# Patient Record
Sex: FEMALE | Race: WHITE
[De-identification: names, ages, dates, MRNs, and addresses within clinical notes are randomized per-mention and may not be internally consistent; named-entity substitution may affect disease eponyms.]

---

## 2018-09-10 ENCOUNTER — HOSPITAL ENCOUNTER (INPATIENT)
Dept: HOSPITAL 25 - MCHOB | Age: 33
LOS: 2 days | Discharge: HOME | DRG: 540 | End: 2018-09-12
Attending: OBSTETRICS & GYNECOLOGY | Admitting: OBSTETRICS & GYNECOLOGY
Payer: COMMERCIAL

## 2018-09-10 DIAGNOSIS — O34.211: Primary | ICD-10-CM

## 2018-09-10 DIAGNOSIS — Z3A.41: ICD-10-CM

## 2018-09-10 DIAGNOSIS — O48.0: ICD-10-CM

## 2018-09-10 PROCEDURE — 36415 COLL VENOUS BLD VENIPUNCTURE: CPT

## 2018-09-10 PROCEDURE — 85025 COMPLETE CBC W/AUTO DIFF WBC: CPT

## 2018-09-10 RX ADMIN — DOCUSATE SODIUM SCH MG: 100 CAPSULE, LIQUID FILLED ORAL at 14:21

## 2018-09-10 RX ADMIN — IBUPROFEN PRN MG: 600 TABLET, FILM COATED ORAL at 12:10

## 2018-09-10 RX ADMIN — SIMETHICONE CHEW TAB 80 MG SCH MG: 80 TABLET ORAL at 12:10

## 2018-09-10 RX ADMIN — SIMETHICONE CHEW TAB 80 MG SCH MG: 80 TABLET ORAL at 20:35

## 2018-09-10 RX ADMIN — DOCUSATE SODIUM SCH MG: 100 CAPSULE, LIQUID FILLED ORAL at 20:35

## 2018-09-10 RX ADMIN — DIPHENHYDRAMINE HYDROCHLORIDE PRN MG: 25 CAPSULE ORAL at 19:30

## 2018-09-10 RX ADMIN — SIMETHICONE CHEW TAB 80 MG SCH MG: 80 TABLET ORAL at 18:10

## 2018-09-10 RX ADMIN — IBUPROFEN PRN MG: 600 TABLET, FILM COATED ORAL at 18:11

## 2018-09-10 NOTE — OP
OPERATIVE REPORT:

 

DATE OF OPERATION:  09/10/18

 

DATE OF BIRTH:  [birth year]

 

SURGEON:  Ese Seo MD

 

ASSISTANT:  Elliot Cannon CNM

 

PRE-OP DIAGNOSES:  Intrauterine gestation of 41 weeks, prior  section.

 

POST-OP DIAGNOSES:  Intrauterine gestation of 41 weeks, prior  section.

 

OPERATIVE PROCEDURE:  Repeat lower transverse  section.

 

ESTIMATED BLOOD LOSS:  700 mL.

 

FLUIDS:  Crystalloid.

 

FINDINGS:  Normal-appearing uterus, ovaries and tubes, normal-appearing placenta
, female infant, weight 9 pounds 3 ounces, Apgars of 8 and 9.

 

DRAINS:  Palacios catheter with clear urine.

 

COUNTS:  All correct.

 

DESCRIPTION OF PROCEDURE:  After informed consent was signed, the patient was 
taken to the operating room where she was given spinal anesthesia; it was found 
to be adequate.  She was prepped and draped in the dorsal spine position with 
leftward lift.  A Pfannenstiel skin incision was made with a scalpel and 
carried down to the underlying layer of fascia with the scalpel.  The fascia 
was incised on either side of the midline and the fascial incision extended 
laterally with the Rodriguez scissors. The inferior edge of the fascial incision was 
grasped with Kocher clamps, tented up, and dissected down with sharp dissection 
with the Rodriguez scissors.  Then, the superior edge of the fascial incision was 
grasped with Kocher clamps, tented up, and dissected down with the Rodriguez 
succors.  The rectus muscles were  in the midline and the peritoneum 
was entered bluntly.  The peritoneal incision was extended with the Rodriguez 
scissors.  The bladder blade was inserted and a transverse incision was made in 
the lower uterine segment with the scalpel.  The uterine incision was extended 
superiorly and inferiorly with blunt pressure.  The infant's head then 
delivered with fundal pressure followed by the shoulders and the rest of the 
body.  The cord was milked towards the baby and clamped x2 and cut.  The baby 
was handed to the neonatologist.  Cord blood was collected.  Placenta delivered 
with fundal massage and gentle cord traction.  The uterus was cleared of clots 
and debris.  The uterine incision was then closed with 0 Vicryl in a running 
locked fashion with a second layer of suture imbricating the first.  The 
abdomen was irrigated.  The incision was inspected again and good hemostasis 
was noted.  The peritoneum was closed with 3-0 vicryl in a running unlocked 
fashion.  The fascia was closed with 0 Vicryl in a running unlocked fashion. 
The subcuticular layer was irrigated and the skin was closed with 4-0 Monocryl 
in a running subcuticular fashion.  Mastisol and Steri-Strips were placed on 
the incision.  A dressing was placed.  The patient was cleaned, moved to the 
stretcher, and taken to the recovery room in stable condition.

 

 [id number]/CPS #: [id number]

ASCENCION

## 2018-09-11 LAB
BASOPHILS # BLD AUTO: 0.1 10^3/UL (ref 0–0.2)
EOSINOPHIL # BLD AUTO: 0 10^3/UL (ref 0–0.6)
HCT VFR BLD AUTO: 28 % (ref 35–47)
HGB BLD-MCNC: 9.6 G/DL (ref 12–16)
LYMPHOCYTES # BLD AUTO: 1.6 10^3/UL (ref 1–4.8)
MCH RBC QN AUTO: 29 PG (ref 27–31)
MCHC RBC AUTO-ENTMCNC: 35 G/DL (ref 31–36)
MCV RBC AUTO: 85 FL (ref 80–97)
MONOCYTES # BLD AUTO: 0.7 10^3/UL (ref 0–0.8)
NEUTROPHILS # BLD AUTO: 6.8 10^3/UL (ref 1.5–7.7)
NRBC # BLD AUTO: 0 10^3/UL
NRBC BLD QL AUTO: 0
PLATELET # BLD AUTO: 130 10^3/UL (ref 150–450)
RBC # BLD AUTO: 3.28 10^6/UL (ref 4–5.4)
WBC # BLD AUTO: 9.2 10^3/UL (ref 3.5–10.8)

## 2018-09-11 RX ADMIN — Medication SCH MG: at 08:58

## 2018-09-11 RX ADMIN — DOCUSATE SODIUM SCH MG: 100 CAPSULE, LIQUID FILLED ORAL at 20:59

## 2018-09-11 RX ADMIN — SIMETHICONE CHEW TAB 80 MG SCH MG: 80 TABLET ORAL at 17:51

## 2018-09-11 RX ADMIN — IBUPROFEN PRN MG: 600 TABLET, FILM COATED ORAL at 17:51

## 2018-09-11 RX ADMIN — SIMETHICONE CHEW TAB 80 MG SCH MG: 80 TABLET ORAL at 08:58

## 2018-09-11 RX ADMIN — Medication SCH MG: at 20:55

## 2018-09-11 RX ADMIN — SIMETHICONE CHEW TAB 80 MG SCH MG: 80 TABLET ORAL at 13:52

## 2018-09-11 RX ADMIN — SIMETHICONE CHEW TAB 80 MG SCH MG: 80 TABLET ORAL at 21:01

## 2018-09-11 RX ADMIN — DOCUSATE SODIUM SCH MG: 100 CAPSULE, LIQUID FILLED ORAL at 08:58

## 2018-09-11 RX ADMIN — DOCUSATE SODIUM SCH MG: 100 CAPSULE, LIQUID FILLED ORAL at 13:52

## 2018-09-11 RX ADMIN — IBUPROFEN PRN MG: 600 TABLET, FILM COATED ORAL at 00:19

## 2018-09-11 RX ADMIN — IBUPROFEN PRN MG: 600 TABLET, FILM COATED ORAL at 08:58

## 2018-09-11 RX ADMIN — DIPHENHYDRAMINE HYDROCHLORIDE PRN MG: 25 CAPSULE ORAL at 04:06

## 2018-09-12 VITALS — SYSTOLIC BLOOD PRESSURE: 121 MMHG | DIASTOLIC BLOOD PRESSURE: 85 MMHG

## 2018-09-12 RX ADMIN — DOCUSATE SODIUM SCH MG: 100 CAPSULE, LIQUID FILLED ORAL at 11:26

## 2018-09-12 RX ADMIN — IBUPROFEN PRN MG: 600 TABLET, FILM COATED ORAL at 04:21

## 2018-09-12 RX ADMIN — IBUPROFEN PRN MG: 600 TABLET, FILM COATED ORAL at 11:26

## 2018-09-12 RX ADMIN — Medication SCH MG: at 11:25
